# Patient Record
Sex: MALE | Race: WHITE | Employment: FULL TIME | URBAN - METROPOLITAN AREA
[De-identification: names, ages, dates, MRNs, and addresses within clinical notes are randomized per-mention and may not be internally consistent; named-entity substitution may affect disease eponyms.]

---

## 2021-02-10 ENCOUNTER — HOSPITAL ENCOUNTER (OUTPATIENT)
Dept: NON INVASIVE DIAGNOSTICS | Age: 31
Discharge: HOME | End: 2021-02-10
Payer: COMMERCIAL

## 2021-02-10 PROCEDURE — 93017 CV STRESS TEST TRACING ONLY: CPT | Performed by: INTERNAL MEDICINE

## 2021-02-10 NOTE — PROGRESS NOTES
Patient instructed on Plain [employer]lick Protocol Stress Test Procedure including possible side effects. Verbalizes knowledge and understanding and denies having any questions.